# Patient Record
Sex: MALE | Race: WHITE
[De-identification: names, ages, dates, MRNs, and addresses within clinical notes are randomized per-mention and may not be internally consistent; named-entity substitution may affect disease eponyms.]

---

## 2019-06-04 ENCOUNTER — HOSPITAL ENCOUNTER (EMERGENCY)
Dept: HOSPITAL 58 - ED | Age: 39
Discharge: HOME | End: 2019-06-04

## 2019-06-04 VITALS — BODY MASS INDEX: 24.3 KG/M2

## 2019-06-04 VITALS — TEMPERATURE: 97.6 F | SYSTOLIC BLOOD PRESSURE: 170 MMHG | DIASTOLIC BLOOD PRESSURE: 114 MMHG

## 2019-06-04 DIAGNOSIS — K08.89: Primary | ICD-10-CM

## 2019-06-04 DIAGNOSIS — F17.210: ICD-10-CM

## 2019-06-04 DIAGNOSIS — K02.7: ICD-10-CM

## 2019-06-04 PROCEDURE — 99282 EMERGENCY DEPT VISIT SF MDM: CPT

## 2019-06-04 NOTE — ED.PDOC
General


ED Provider: 


Dr. DIONI RUIZ





Chief Complaint: Tooth Problem


Stated Complaint: tothache


Time Seen by Physician: 17:56


Mode of Arrival: Walk-In


Information Source: Patient


Exam Limitations: No limitations


Nursing and Triage Documentation Reviewed and Agree: Yes


Does patient meet sepsis criteria?: No


System Inflammatory Response Syndrome: Not Applicable


Sepsis Protocol: 


For patient's 13 years and over:





Temp is 96.8 and below  and greater


Pulse >90 BPM


Resp >20/minute


Acutely Altered Mental Status





Are patient's symptoms suggestive of a new infection, such as:


   -Pneumonia


   -Skin, Soft Tissue


   -Endocarditis


   -UTI


   -Bone, Joint Infection


   -Implantable Device


   -Acute Abdominal Infection


   -Wound Infection


   -Meningitis


   -Blood Stream Catheter Infection


   -Unknown








EENT Complaint Exam





- Dental/Oral Complaint/Exam


Onset/Duration: 2 days


Symptoms Are: Still present


Timing: Intermittent


Initial Severity: Mild


Current Severity: Mild


Character: Reports: Aching


Alleviating: Reports: None


Associated Signs and Symptoms: Reports: Swelling, Foul odor


Dental/Oral Surgical History: Reports: None


Tooth Findings: Present: Gross decay


Cervical Lymphadenopathy Present: No


Facial Swelling Present: No


Bleeding Present: No


Septal Hematoma: No


Foreign Body Present: No


Dysphagia Present: No


Drooling Present: No


Asymmetrical Tonsillar Swelling Present: No


Uvula Midline: Yes


Meghan-tonsillar Fluctuence: No


Trismus Present: No


Palatal Petechiae Present: No


Scarlatinaform Rash Present: No


Lesions: Present: Gums


Differential Diagnoses: Dental Abcess, Dental Caries





Review of Systems





- Review Of Systems


Constitutional: Reports: No symptoms


Eyes: Reports: No symptoms


Ears, Nose, Mouth, Throat: Reports: No symptoms


Respiratory: Reports: No symptoms


Cardiac: Reports: No symptoms


GI: Reports: No symptoms


: Reports: No symptoms


Musculoskeletal: Reports: No symptoms


Skin: Reports: No symptoms


Neurological: Reports: No symptoms


Endocrine: Reports: No symptoms


Hematologic/Lymphatic: Reports: No symptoms


All Other Systems: Reviewed and Negative





Past Medical History





- Past Medical History


Previously Healthy: Yes


Endocrine: Reports: Unknown


Cardiovascular: Reports: None, Unknown


Respiratory: Reports: COPD, Other


Hematological: Reports: None


Gastrointestinal: Reports: None


Genitourinary: Reports: UTI


Neuro/Psych: Reports: None


Musculoskeletal: Reports: None


Cancer: Reports: None





- Surgical History


General Surgical History: Reports: None





- Family History


Family History: Reports: Diabetes





- Social History


Smoking Status: Current every day smoker, Heavy tobacco smoker


Hx Substance Use: No


Alcohol Screening: None





Physical Exam





- Physical Exam


Appearance: Well-appearing


Ill-appearing: None


Pain Distress: None


Eyes: PRICILA


ENT: Ears normal, Nose normal


Respiratory: Airway patent


Cardiovascular: RRR, Pulses normal


GI/: Soft, Nontender


Musculoskeletal: Normal strength, Limited strength


Neurological: Sensation intact





Critical Care Note





- Critical Care Note


Total Time (mins): 0





Course





- Course


Vital Signs: 





 











  Temp Pulse Resp BP Pulse Ox


 


 06/04/19 15:47  97.6 F  82  18  170/114 H  95














Departure





- Departure


Time of Disposition: 17:52


Disposition: HOME SELF-CARE


Discharge Problem: 


 Toothache





Instructions:  Dry Mouth (ED)


Condition: Good


Pt referred to PMD for follow-up: Yes


IPMP verified?: No


Additional Instructions: 


Cipro 500 mg bid x 3 dauys,Ultran 50 mg tid x 3d  tab15,


Allergies/Adverse Reactions: 


Allergies





hydrocodone Adverse Reaction (Verified 06/04/19 15:52)


 








Home Medications: 


Ambulatory Orders





Levetiracetam [Keppra] 500 mg PO BID 06/04/19 


Lisinopril [Zestril] 10 mg PO DAILY 06/04/19 


Paroxetine HCl [Paxil] 20 mg PO DAILY 06/04/19 








Disposition Discussed With: Patient, Family

## 2019-08-19 ENCOUNTER — HOSPITAL ENCOUNTER (OUTPATIENT)
Dept: HOSPITAL 58 - RHC-LAB | Age: 39
Discharge: HOME | End: 2019-08-19
Attending: FAMILY MEDICINE

## 2019-08-19 VITALS — BODY MASS INDEX: 24.3 KG/M2

## 2019-08-19 DIAGNOSIS — F19.11: ICD-10-CM

## 2019-08-19 DIAGNOSIS — I10: ICD-10-CM

## 2019-08-19 DIAGNOSIS — Z00.00: Primary | ICD-10-CM

## 2019-08-19 PROCEDURE — 87389 HIV-1 AG W/HIV-1&-2 AB AG IA: CPT

## 2019-08-19 PROCEDURE — 85025 COMPLETE CBC W/AUTO DIFF WBC: CPT

## 2019-08-19 PROCEDURE — 86803 HEPATITIS C AB TEST: CPT

## 2019-08-19 PROCEDURE — 80061 LIPID PANEL: CPT

## 2019-08-19 PROCEDURE — 36415 COLL VENOUS BLD VENIPUNCTURE: CPT

## 2019-08-19 PROCEDURE — 80053 COMPREHEN METABOLIC PANEL: CPT
